# Patient Record
Sex: MALE | URBAN - METROPOLITAN AREA
[De-identification: names, ages, dates, MRNs, and addresses within clinical notes are randomized per-mention and may not be internally consistent; named-entity substitution may affect disease eponyms.]

---

## 2018-07-16 ENCOUNTER — TELEPHONE (OUTPATIENT)
Dept: CARDIOLOGY CLINIC | Facility: CLINIC | Age: 68
End: 2018-07-16

## 2018-07-16 RX ORDER — LEVOTHYROXINE SODIUM 0.1 MG/1
TABLET ORAL EVERY 24 HOURS
COMMUNITY

## 2018-07-16 RX ORDER — AMLODIPINE BESYLATE 10 MG/1
TABLET ORAL
COMMUNITY
Start: 2018-02-01 | End: 2019-01-16 | Stop reason: SDUPTHER

## 2018-07-16 RX ORDER — LISINOPRIL 20 MG/1
TABLET ORAL
COMMUNITY
Start: 2015-10-02 | End: 2018-08-09 | Stop reason: ALTCHOICE

## 2018-07-16 RX ORDER — VALSARTAN 80 MG/1
TABLET ORAL EVERY 24 HOURS
COMMUNITY
Start: 2018-04-30 | End: 2018-08-09 | Stop reason: CLARIF

## 2018-07-16 RX ORDER — PRAVASTATIN SODIUM 40 MG
TABLET ORAL EVERY 24 HOURS
COMMUNITY
Start: 2018-06-05 | End: 2018-10-12 | Stop reason: SDUPTHER

## 2018-07-16 RX ORDER — NEBIVOLOL 5 MG/1
TABLET ORAL EVERY 24 HOURS
COMMUNITY
Start: 2018-01-25 | End: 2018-10-18 | Stop reason: DRUGHIGH

## 2018-07-16 NOTE — TELEPHONE ENCOUNTER
Pt called, Bystolic not covered by his insurance  Is there an alternative you would like to change pt to? Please advise

## 2018-07-18 ENCOUNTER — TELEPHONE (OUTPATIENT)
Dept: CARDIOLOGY CLINIC | Facility: CLINIC | Age: 68
End: 2018-07-18

## 2018-07-18 NOTE — TELEPHONE ENCOUNTER
Patient called reporting that his insurance will not cover Darral Boris, Dr Kristine Marc offered to replace Bystolic with Metoprolol succ  25mg QD, but patient states that it has affected his diabetes a few years ago and does not want to try this medication again  Samples given until a resolution is found

## 2018-08-09 DIAGNOSIS — I10 ESSENTIAL HYPERTENSION: Primary | ICD-10-CM

## 2018-08-09 RX ORDER — LOSARTAN POTASSIUM 50 MG/1
50 TABLET ORAL DAILY
Qty: 90 TABLET | Refills: 3 | Status: SHIPPED | OUTPATIENT
Start: 2018-08-09 | End: 2018-10-18 | Stop reason: SDUPTHER

## 2018-10-12 DIAGNOSIS — E78.2 MIXED HYPERLIPIDEMIA: Primary | ICD-10-CM

## 2018-10-14 RX ORDER — PRAVASTATIN SODIUM 40 MG
TABLET ORAL
Qty: 30 TABLET | Refills: 3 | Status: SHIPPED | OUTPATIENT
Start: 2018-10-14 | End: 2019-01-16 | Stop reason: SDUPTHER

## 2018-10-18 DIAGNOSIS — I10 ESSENTIAL HYPERTENSION: ICD-10-CM

## 2018-10-18 RX ORDER — METOPROLOL SUCCINATE 25 MG/1
25 TABLET, EXTENDED RELEASE ORAL DAILY
Qty: 30 TABLET | Refills: 3 | Status: SHIPPED | OUTPATIENT
Start: 2018-10-18 | End: 2019-01-16 | Stop reason: SDUPTHER

## 2018-10-18 RX ORDER — LOSARTAN POTASSIUM 50 MG/1
50 TABLET ORAL 2 TIMES DAILY
Qty: 180 TABLET | Refills: 3 | Status: SHIPPED | OUTPATIENT
Start: 2018-10-18

## 2018-10-18 NOTE — PROGRESS NOTES
Patient informed about  the affordability of Bystolic  Did not want to take metoprolol as he was told that it may affect his diabetes  -  We are discontinuing Bystolic and beginning metoprolol succinate 25 mg daily  I have reassured him that this should be all right and requested him to inform us if he has symptoms such as excess fatigue or worsening blood sugars  -   We are doubling the dose of losartan to 50 mg twice daily  -   Will continue amlodipine 10 mg daily

## 2019-01-15 DIAGNOSIS — I10 ESSENTIAL HYPERTENSION: Primary | ICD-10-CM

## 2019-01-15 DIAGNOSIS — E78.2 MIXED HYPERLIPIDEMIA: ICD-10-CM

## 2019-01-16 RX ORDER — AMLODIPINE BESYLATE 10 MG/1
TABLET ORAL
Qty: 90 TABLET | Refills: 3 | Status: SHIPPED | OUTPATIENT
Start: 2019-01-16

## 2019-01-16 RX ORDER — PRAVASTATIN SODIUM 40 MG
TABLET ORAL
Qty: 30 TABLET | Refills: 3 | Status: SHIPPED | OUTPATIENT
Start: 2019-01-16 | End: 2019-05-06 | Stop reason: SDUPTHER

## 2019-01-16 RX ORDER — METOPROLOL SUCCINATE 25 MG/1
25 TABLET, EXTENDED RELEASE ORAL DAILY
Qty: 90 TABLET | Refills: 3 | Status: SHIPPED | OUTPATIENT
Start: 2019-01-16

## 2019-05-06 DIAGNOSIS — E78.2 MIXED HYPERLIPIDEMIA: ICD-10-CM

## 2019-05-06 RX ORDER — PRAVASTATIN SODIUM 40 MG
TABLET ORAL
Qty: 30 TABLET | Refills: 3 | Status: SHIPPED | OUTPATIENT
Start: 2019-05-06 | End: 2019-09-04 | Stop reason: SDUPTHER

## 2019-09-04 DIAGNOSIS — E78.2 MIXED HYPERLIPIDEMIA: ICD-10-CM

## 2019-09-06 RX ORDER — PRAVASTATIN SODIUM 40 MG
TABLET ORAL
Qty: 30 TABLET | Refills: 3 | Status: SHIPPED | OUTPATIENT
Start: 2019-09-06 | End: 2019-12-28 | Stop reason: SDUPTHER

## 2019-12-28 DIAGNOSIS — E78.2 MIXED HYPERLIPIDEMIA: ICD-10-CM

## 2019-12-28 RX ORDER — PRAVASTATIN SODIUM 40 MG
TABLET ORAL
Qty: 30 TABLET | Refills: 3 | Status: SHIPPED | OUTPATIENT
Start: 2019-12-28